# Patient Record
Sex: MALE | Race: WHITE
[De-identification: names, ages, dates, MRNs, and addresses within clinical notes are randomized per-mention and may not be internally consistent; named-entity substitution may affect disease eponyms.]

---

## 2018-07-27 ENCOUNTER — HOSPITAL ENCOUNTER (OUTPATIENT)
Dept: HOSPITAL 61 - ENDOS | Age: 68
Discharge: HOME | End: 2018-07-27
Attending: INTERNAL MEDICINE
Payer: MEDICARE

## 2018-07-27 DIAGNOSIS — Z12.11: Primary | ICD-10-CM

## 2018-07-27 DIAGNOSIS — K21.9: ICD-10-CM

## 2018-07-27 DIAGNOSIS — K64.0: ICD-10-CM

## 2018-07-27 DIAGNOSIS — Z98.890: ICD-10-CM

## 2018-07-27 DIAGNOSIS — Z80.0: ICD-10-CM

## 2018-07-27 DIAGNOSIS — J45.909: ICD-10-CM

## 2018-07-27 DIAGNOSIS — K57.30: ICD-10-CM

## 2018-07-27 DIAGNOSIS — M19.90: ICD-10-CM

## 2018-07-27 DIAGNOSIS — Z98.52: ICD-10-CM

## 2018-07-27 DIAGNOSIS — Z82.49: ICD-10-CM

## 2018-07-27 DIAGNOSIS — Z86.010: ICD-10-CM

## 2018-07-27 DIAGNOSIS — Z79.899: ICD-10-CM

## 2018-07-27 PROCEDURE — 45378 DIAGNOSTIC COLONOSCOPY: CPT

## 2018-07-27 RX ADMIN — SODIUM CHLORIDE, SODIUM LACTATE, POTASSIUM CHLORIDE, AND CALCIUM CHLORIDE 1 MLS/HR: .6; .31; .03; .02 INJECTION, SOLUTION INTRAVENOUS at 08:52

## 2021-08-17 ENCOUNTER — HOSPITAL ENCOUNTER (EMERGENCY)
Dept: HOSPITAL 63 - ER | Age: 71
Discharge: HOME | End: 2021-08-17
Payer: MEDICARE

## 2021-08-17 VITALS
SYSTOLIC BLOOD PRESSURE: 145 MMHG | SYSTOLIC BLOOD PRESSURE: 145 MMHG | DIASTOLIC BLOOD PRESSURE: 89 MMHG | DIASTOLIC BLOOD PRESSURE: 89 MMHG

## 2021-08-17 VITALS — HEIGHT: 72 IN | BODY MASS INDEX: 28.37 KG/M2 | WEIGHT: 209.44 LBS

## 2021-08-17 DIAGNOSIS — R33.8: Primary | ICD-10-CM

## 2021-08-17 DIAGNOSIS — J45.909: ICD-10-CM

## 2021-08-17 DIAGNOSIS — K21.9: ICD-10-CM

## 2021-08-17 DIAGNOSIS — R35.0: ICD-10-CM

## 2021-08-17 DIAGNOSIS — R31.9: ICD-10-CM

## 2021-08-17 LAB
APTT PPP: YELLOW S
BACTERIA #/AREA URNS HPF: (no result) /HPF
BILIRUB UR QL STRIP: (no result)
FIBRINOGEN PPP-MCNC: CLEAR MG/DL
GLUCOSE UR STRIP-MCNC: (no result) MG/DL
NITRITE UR QL STRIP: (no result)
RBC #/AREA URNS HPF: (no result) /HPF (ref 0–2)
SP GR UR STRIP: 1.01
SQUAMOUS #/AREA URNS LPF: (no result) /LPF
UROBILINOGEN UR-MCNC: 0.2 MG/DL
WBC #/AREA URNS HPF: (no result) /HPF (ref 0–4)

## 2021-08-17 PROCEDURE — 81001 URINALYSIS AUTO W/SCOPE: CPT

## 2021-08-17 PROCEDURE — 74176 CT ABD & PELVIS W/O CONTRAST: CPT

## 2021-08-17 PROCEDURE — 87086 URINE CULTURE/COLONY COUNT: CPT

## 2021-08-17 PROCEDURE — 99285 EMERGENCY DEPT VISIT HI MDM: CPT

## 2021-08-17 PROCEDURE — 51702 INSERT TEMP BLADDER CATH: CPT

## 2021-08-17 PROCEDURE — 99284 EMERGENCY DEPT VISIT MOD MDM: CPT

## 2021-08-18 ENCOUNTER — HOSPITAL ENCOUNTER (OUTPATIENT)
Dept: HOSPITAL 63 - NM | Age: 71
End: 2021-08-18
Payer: MEDICARE

## 2021-08-18 DIAGNOSIS — R06.00: Primary | ICD-10-CM

## 2021-08-18 PROCEDURE — 93017 CV STRESS TEST TRACING ONLY: CPT

## 2021-08-18 PROCEDURE — 78452 HT MUSCLE IMAGE SPECT MULT: CPT

## 2021-08-18 PROCEDURE — A9500 TC99M SESTAMIBI: HCPCS

## 2021-08-18 NOTE — RAD
MR#: [id number]

Account#: [id number]

Accession#: [id number]

Date of Study: 08/18/2021

Ordering Physician: ZAK PEGUERO, 

Referring Physician: TATE ROUSSEAU Tech: RT CAITY Maldonado) (N)





--------------- APPROVED REPORT --------------





Test Type:          Pharmacological

Stress Nurse/Tech: RT Erica (JADA) (N)

Test Indications: dyspnea and fatigue

Cardiac History: AFib 1 year ago

Medications:     see ehr

Medical History: hypertension

Resting ECG:     sinus rhythm

Resting Heart Rate: 68 bpm

Resting Blood Pressure: 137/77mmHg

Pretest Chest Pain: None



Nurse/Tech Notes

Consent: The procedure was explained to the patient in lay terms. Informed consent was witnessed. Ricky
eout was entered into [facility] [employer]. History and Stress Test performed by RT CAITY Maldonado) (N)



Pharm. Details

Pharmacologic stress testing was performed using 0.4mg per 5ml of regadenoson given intravenously ove
r 7-10 seconds.



POST EXERCISE

Reason for Termination: Infusion complete

Max HR: 158 bpm

Max Blood Pressure: 141/69mmHg



INTERPRETATION

Stress EKG Conclusion: The resting EKG shows a sinus rhythm with nonspecific ST-T wave changes.

The stress EKG shows no significant changes from baseline.

No EKG evidence of stress-induced ischemia.



Imaging Protocol

IMAGE PROTOCOL: Rest Tc-99m/stress Tc-99m 1 day



Rest:            Stress:         Viability:   

Radiopharm.Tc99m LhejctsgkNb04s Sestamibi

Ddiy90tAc            31mCi            

Duration    15min.           10min.           

Img Date  08/18/2021 08/18/2021      

Inj-Img Xquu41acd.           60min.           



Rest Admin Site:IV - Right AntecubitalAdministrator: RT Erica (JADA)(N)

Stress Admin Site: IV - Right AntecubitalAdministrator: RT CAITY Maldonado)(N)



STRESS DATA

End Diast. Vol.122.0mlAv. Heart Rate83.0bpm

End Syst. Vol.28.0mlCO Index BSA0.0L/min

Myocardial Avmx178.0gEject. Xcsrnfxc14.0%



Stress Rates

Pk. Fill Rate3.72EDV/secLVtime Pk. Fill 205.42msec

Pk. Empty Rate4.77ESV/secLVtime Pk. Irsts928.95msec

1/3 Pk. Fill1.43EDV/sec



Stress Scores

Regional WT0.00Summed WT0.00

Regional WM0.00Summed WM1.00



LV Perfusion

The stress scans show no significant defects.

The rest scans show no significant defects.

Nuclear imaging shows no reversible ischemia or infarct.



Wall Motion

Left ventricular systolic function is normal with no regional wall motion abnormalities and an ejecti
on fraction of greater than 70%.



LV Perf. Quant

17 Seg. SSS2.00

17 Seg. SRS3.00

17 Seg. SDS0.00

Stress Defect Extent (% LAD)0.00Rest Defect Extent (% LAD)0.00Rev. Defect Extent (% LAD)0.00

Stress Defect Extent (% LCX) 18.80Rest Defect Extent (% LCX)26.30Rev. Defect Extent (% LCX)6.30

Stress Defect Extent (% RCA)0.00Rest Defect Extent (% RCA)8.90Rev. Defect Extent (% RCA)0.00

Stress Defect Extent (% DUC)3.30Rest Defect Extent (% DUC)8.00Rev. Defect Extent (% DUC)1.10



Conclusion

1. No EKG evidence of stress-induced ischemia.

2. Nuclear imaging shows no reversible ischemia or infarct.

3. Left ventricular systolic function is normal with an ejection fraction of greater than 70%.

4. Low risk Lexiscan nuclear stress test.



Signed by : Corey Cortez MD

Electronically Approved : 08/18/2021 13:17:48

## 2021-08-20 ENCOUNTER — HOSPITAL ENCOUNTER (EMERGENCY)
Dept: HOSPITAL 63 - ER | Age: 71
Discharge: HOME | End: 2021-08-20
Payer: MEDICARE

## 2021-08-20 VITALS — DIASTOLIC BLOOD PRESSURE: 87 MMHG | SYSTOLIC BLOOD PRESSURE: 150 MMHG

## 2021-08-20 VITALS — HEIGHT: 72 IN | BODY MASS INDEX: 28.37 KG/M2 | WEIGHT: 209.44 LBS

## 2021-08-20 DIAGNOSIS — T83.511A: Primary | ICD-10-CM

## 2021-08-20 DIAGNOSIS — K21.9: ICD-10-CM

## 2021-08-20 DIAGNOSIS — N39.0: ICD-10-CM

## 2021-08-20 DIAGNOSIS — J45.909: ICD-10-CM

## 2021-08-20 LAB
APTT PPP: YELLOW S
BACTERIA #/AREA URNS HPF: (no result) /HPF
BILIRUB UR QL STRIP: (no result)
FIBRINOGEN PPP-MCNC: (no result) MG/DL
GLUCOSE UR STRIP-MCNC: (no result) MG/DL
NITRITE UR QL STRIP: (no result)
RBC #/AREA URNS HPF: (no result) /HPF (ref 0–2)
SP GR UR STRIP: 1.02
SQUAMOUS #/AREA URNS LPF: (no result) /LPF
UROBILINOGEN UR-MCNC: 0.2 MG/DL
WBC #/AREA URNS HPF: (no result) /HPF (ref 0–4)

## 2021-08-20 PROCEDURE — 99283 EMERGENCY DEPT VISIT LOW MDM: CPT

## 2021-08-20 PROCEDURE — 87086 URINE CULTURE/COLONY COUNT: CPT

## 2021-08-20 PROCEDURE — 51702 INSERT TEMP BLADDER CATH: CPT

## 2021-08-20 PROCEDURE — 81001 URINALYSIS AUTO W/SCOPE: CPT

## 2021-08-20 NOTE — PHYS DOC
Past History


Past Medical History:  Asthma, GERD, Other


Additional Past Medical Histor:  NECK


Past Surgical History:  TURP, Other


Additional Past Surgical Histo:  WRIST


Alcohol Use:  None





General Adult


EDM:


Chief Complaint:  URINE CATHETER PROBLEM





HPI:


HPI:


71-year-old male presents with concern about his Mandujano catheter.  The patient 

has noticed some sediment in the urine.  He is also had some spasms of the 

bladder and wants to make sure things are working properly.  He thought he had a

fever at home.  No fever on arrival.  He has no other concerns at this time.





Review of Systems:


Review of Systems:


Constitutional:  Denies fever or chills 


Eyes:  Denies change in visual acuity 


HENT:  Denies nasal congestion or sore throat 


Respiratory:  Denies cough or shortness of breath 


Cardiovascular:  Denies chest pain or edema 


GI:  Denies abdominal pain, nausea, vomiting, bloody stools or diarrhea 


: Urinary issues


Musculoskeletal:  Denies back pain or joint pain 


Integument:  Denies rash 


Neurologic:  Denies headache, focal weakness or sensory changes 


Endocrine:  Denies polyuria or polydipsia 


Lymphatic:  Denies swollen glands 


Psychiatric:  Denies depression or anxiety





Allergies:


Allergies:





Allergies








Coded Allergies Type Severity Reaction Last Updated Verified


 


  No Known Drug Allergies    8/17/21 No











Physical Exam:


PE:





Constitutional: Well developed, well nourished, no acute distress, non-toxic ap

pearance. []


HENT: Normocephalic, atraumatic, bilateral external ears normal, oropharynx 

moist, no oral exudates, nose normal. []


Eyes: PERRLA, EOMI, conjunctiva normal, no discharge. [] 


Neck: Normal range of motion, no tenderness, supple, no stridor. [] 


Cardiovascular:Heart rate regular rhythm, no murmur []


Lungs & Thorax:  Bilateral breath sounds clear to auscultation []


Abdomen: Bowel sounds normal, soft, no tenderness, no masses, no pulsatile 

masses. [] 


Skin: Warm, dry, no erythema, no rash. [] 


Back: No tenderness, no CVA tenderness. [] 


Extremities: No tenderness, no cyanosis, no clubbing, ROM intact, no edema. [] 


Neurologic: Alert and oriented X 3, normal motor function, normal sensory 

function, no focal deficits noted. []


Psychologic: Affect normal, judgement normal, mood normal.


: Mandujano catheter in place.  []





Current Patient Data:


Vital Signs:





                                   Vital Signs








  Date Time  Temp Pulse Resp B/P (MAP) Pulse Ox O2 Delivery O2 Flow Rate FiO2


 


8/20/21 15:58 98.9 87 16 151/91 95 Room Air  











EKG:


EKG:


[]





Radiology/Procedures:


Radiology/Procedures:


[]





Heart Score:


C/O Chest Pain:  N/A


Risk Factors:


Risk Factors:  DM, Current or recent (<one month) smoker, HTN, HLP, family 

history of CAD, obesity.


Risk Scores:


Score 0 - 3:  2.5% MACE over next 6 weeks - Discharge Home


Score 4 - 6:  20.3% MACE over next 6 weeks - Admit for Clinical Observation


Score 7 - 10:  72.7% MACE over next 6 weeks - Early Invasive Strategies





Course & Med Decision Making:


Course & Med Decision Making


Pertinent Labs and Imaging studies reviewed. (See chart for details)


The patient's urinalysis is positive for nitrate and leukocyte esterase.  He has

 a lot of bacteria and white cells.  Given the Mandujano catheter, I will treat him 

with levofloxacin 500.  We will give the first dose in the ED.  He is stable for

 discharge at this time.


[]





Dragon Disclaimer:


Dragon Disclaimer:


This electronic medical record was generated, in whole or in part, using a voice

 recognition dictation system.





Departure


Departure:


Impression:  


   Primary Impression:  


   UTI (urinary tract infection)


   Qualified Codes:  T83.511A - Infection and inflammatory reaction due to 

   indwelling urethral catheter, initial encounter; N39.0 - Urinary tract 

   infection, site not specified


Disposition:  01 HOME / SELF CARE / HOMELESS


Condition:  STABLE


Referrals:  


LEESA MARTINES MD (PCP)


Patient Instructions:  Catheter-Associated Urinary Tract Infection FAQs - SHEA, 

Urinary Tract Infection


Scripts


Levofloxacin (LEVOFLOXACIN) 500 Mg Tablet


1 TAB PO DAILY for UTI, #7 TAB


   Prov: REAGAN MORRISSEY DO         8/20/21











REAGAN MORRISSEY DO                 Aug 20, 2021 16:37

## 2021-08-26 ENCOUNTER — HOSPITAL ENCOUNTER (EMERGENCY)
Dept: HOSPITAL 63 - ER | Age: 71
Discharge: HOME | End: 2021-08-26
Payer: MEDICARE

## 2021-08-26 VITALS — SYSTOLIC BLOOD PRESSURE: 126 MMHG | DIASTOLIC BLOOD PRESSURE: 64 MMHG

## 2021-08-26 VITALS — HEIGHT: 72 IN | BODY MASS INDEX: 28.37 KG/M2 | WEIGHT: 209.44 LBS

## 2021-08-26 VITALS
SYSTOLIC BLOOD PRESSURE: 139 MMHG | SYSTOLIC BLOOD PRESSURE: 139 MMHG | DIASTOLIC BLOOD PRESSURE: 69 MMHG | DIASTOLIC BLOOD PRESSURE: 69 MMHG

## 2021-08-26 VITALS — SYSTOLIC BLOOD PRESSURE: 162 MMHG | DIASTOLIC BLOOD PRESSURE: 45 MMHG

## 2021-08-26 VITALS — SYSTOLIC BLOOD PRESSURE: 136 MMHG | DIASTOLIC BLOOD PRESSURE: 76 MMHG

## 2021-08-26 VITALS — SYSTOLIC BLOOD PRESSURE: 127 MMHG | DIASTOLIC BLOOD PRESSURE: 64 MMHG

## 2021-08-26 VITALS — DIASTOLIC BLOOD PRESSURE: 69 MMHG | SYSTOLIC BLOOD PRESSURE: 139 MMHG

## 2021-08-26 VITALS — SYSTOLIC BLOOD PRESSURE: 139 MMHG | DIASTOLIC BLOOD PRESSURE: 83 MMHG

## 2021-08-26 DIAGNOSIS — R42: ICD-10-CM

## 2021-08-26 DIAGNOSIS — K21.9: ICD-10-CM

## 2021-08-26 DIAGNOSIS — J45.909: ICD-10-CM

## 2021-08-26 DIAGNOSIS — D64.9: Primary | ICD-10-CM

## 2021-08-26 LAB
ALBUMIN SERPL-MCNC: 2.8 G/DL (ref 3.4–5)
ALBUMIN/GLOB SERPL: 1 {RATIO} (ref 1–1.7)
ALP SERPL-CCNC: 51 U/L (ref 46–116)
ALT SERPL-CCNC: 29 U/L (ref 16–63)
ANION GAP SERPL CALC-SCNC: 4 MMOL/L (ref 6–14)
APTT PPP: YELLOW S
AST SERPL-CCNC: 24 U/L (ref 15–37)
BACTERIA #/AREA URNS HPF: 0 /HPF
BASOPHILS # BLD AUTO: 0 X10^3/UL (ref 0–0.2)
BASOPHILS NFR BLD: 1 % (ref 0–3)
BILIRUB SERPL-MCNC: 0.1 MG/DL (ref 0.2–1)
BILIRUB UR QL STRIP: (no result)
BUN/CREAT SERPL: 15 (ref 6–20)
CA-I SERPL ISE-MCNC: 20 MG/DL (ref 8–26)
CALCIUM SERPL-MCNC: 8.5 MG/DL (ref 8.5–10.1)
CHLORIDE SERPL-SCNC: 106 MMOL/L (ref 98–107)
CO2 SERPL-SCNC: 29 MMOL/L (ref 21–32)
CREAT SERPL-MCNC: 1.3 MG/DL (ref 0.7–1.3)
EOSINOPHIL NFR BLD: 0.2 X10^3/UL (ref 0–0.7)
EOSINOPHIL NFR BLD: 3 % (ref 0–3)
ERYTHROCYTE [DISTWIDTH] IN BLOOD BY AUTOMATED COUNT: 15.1 % (ref 11.5–14.5)
FIBRINOGEN PPP-MCNC: CLEAR MG/DL
GFR SERPLBLD BASED ON 1.73 SQ M-ARVRAT: 54.1 ML/MIN
GLOBULIN SER-MCNC: 2.7 G/DL (ref 2.2–3.8)
GLUCOSE SERPL-MCNC: 135 MG/DL (ref 70–99)
GLUCOSE UR STRIP-MCNC: (no result) MG/DL
HCT VFR BLD CALC: 22.8 % (ref 39–53)
HGB BLD-MCNC: 7.4 G/DL (ref 13–17.5)
LYMPHOCYTES # BLD: 0.8 X10^3/UL (ref 1–4.8)
LYMPHOCYTES NFR BLD AUTO: 16 % (ref 24–48)
MAGNESIUM SERPL-MCNC: 2.1 MG/DL (ref 1.8–2.4)
MCH RBC QN AUTO: 29 PG (ref 25–35)
MCHC RBC AUTO-ENTMCNC: 33 G/DL (ref 31–37)
MCV RBC AUTO: 88 FL (ref 79–100)
MONO #: 0.4 X10^3/UL (ref 0–1.1)
MONOCYTES NFR BLD: 8 % (ref 0–9)
NEUT #: 3.7 X10^3UL (ref 1.8–7.7)
NEUTROPHILS NFR BLD AUTO: 73 % (ref 31–73)
NITRITE UR QL STRIP: (no result)
PLATELET # BLD AUTO: 230 X10^3/UL (ref 140–400)
POTASSIUM SERPL-SCNC: 4.3 MMOL/L (ref 3.5–5.1)
PROT SERPL-MCNC: 5.5 G/DL (ref 6.4–8.2)
RBC # BLD AUTO: 2.6 X10^6/UL (ref 4.3–5.7)
RBC #/AREA URNS HPF: 0 /HPF (ref 0–2)
SODIUM SERPL-SCNC: 139 MMOL/L (ref 136–145)
SP GR UR STRIP: 1.01
SQUAMOUS #/AREA URNS LPF: (no result) /LPF
UROBILINOGEN UR-MCNC: 0.2 MG/DL
WBC # BLD AUTO: 5.1 X10^3/UL (ref 4–11)
WBC #/AREA URNS HPF: (no result) /HPF (ref 0–4)

## 2021-08-26 PROCEDURE — 83605 ASSAY OF LACTIC ACID: CPT

## 2021-08-26 PROCEDURE — 86920 COMPATIBILITY TEST SPIN: CPT

## 2021-08-26 PROCEDURE — 84484 ASSAY OF TROPONIN QUANT: CPT

## 2021-08-26 PROCEDURE — 36415 COLL VENOUS BLD VENIPUNCTURE: CPT

## 2021-08-26 PROCEDURE — 81001 URINALYSIS AUTO W/SCOPE: CPT

## 2021-08-26 PROCEDURE — 85025 COMPLETE CBC W/AUTO DIFF WBC: CPT

## 2021-08-26 PROCEDURE — 80053 COMPREHEN METABOLIC PANEL: CPT

## 2021-08-26 PROCEDURE — 93005 ELECTROCARDIOGRAM TRACING: CPT

## 2021-08-26 PROCEDURE — 36430 TRANSFUSION BLD/BLD COMPNT: CPT

## 2021-08-26 PROCEDURE — 99291 CRITICAL CARE FIRST HOUR: CPT

## 2021-08-26 PROCEDURE — 83880 ASSAY OF NATRIURETIC PEPTIDE: CPT

## 2021-08-26 PROCEDURE — 86850 RBC ANTIBODY SCREEN: CPT

## 2021-08-26 PROCEDURE — 86901 BLOOD TYPING SEROLOGIC RH(D): CPT

## 2021-08-26 PROCEDURE — 82947 ASSAY GLUCOSE BLOOD QUANT: CPT

## 2021-08-26 PROCEDURE — 82550 ASSAY OF CK (CPK): CPT

## 2021-08-26 PROCEDURE — 71045 X-RAY EXAM CHEST 1 VIEW: CPT

## 2021-08-26 PROCEDURE — P9016 RBC LEUKOCYTES REDUCED: HCPCS

## 2021-08-26 PROCEDURE — 83735 ASSAY OF MAGNESIUM: CPT

## 2021-08-26 PROCEDURE — 86900 BLOOD TYPING SEROLOGIC ABO: CPT

## 2021-08-26 PROCEDURE — 70450 CT HEAD/BRAIN W/O DYE: CPT

## 2021-08-26 NOTE — EKG
[facility] [address], [place] [address]

Test Date:    2021               Test Time:    11:19:01

Pat Name:     GURU PUTNAM              Department:   

Patient ID:   [id number]           Room:          

Gender:       M                        Technician:   1

:          1950               Requested By: MARCELA BURDICK

Order Number: [id number]            Reading MD:     

                                 Measurements

Intervals                              Axis          

Rate:         76                       P:            0

IN:           218                      QRS:          -12

QRSD:         88                       T:            14

QT:           382                                    

QTc:          434                                    

                           Interpretive Statements

SINUS RHYTHM

PROLONGED IN INTERVAL

LEFTWARD AXIS

ABNORMAL ECG

RI6.02

No previous ECG available for comparison

## 2021-08-26 NOTE — RAD
EXAM: Chest, single view.



HISTORY: Shortness of air.



COMPARISON: None.



FINDINGS: A frontal view of the chest is obtained. There is no infiltrate, pleural effusion or pneumo
thorax. The heart is normal in size. There is a large hiatal hernia.



IMPRESSION: No acute pulmonary finding.



Electronically signed by: Shayna Wakefield MD (8/26/2021 11:43 AM) [id number]

## 2021-08-26 NOTE — PHYS DOC
Past History


Past Medical History:  Asthma, GERD, Other


Additional Past Medical Histor:  NECK


 (CRISTIANA CARTWRIGHT)


Past Surgical History:  TURP, Other


Additional Past Surgical Histo:  WRIST


 (CRISTIANA CARTWRIGHT)


Alcohol Use:  None


 (CRISTIANA CARTWRIGHT)





Adult General


Chief Complaint


Chief Complaint:  DIZZY/LIGHT HEADED





HPI


HPI





Patient is a [71-year-old male patient presents feeling lightheaded.  States he 

has felt like this for approximately last week.  States he has seen his primary 

care provider yesterday for removal of his Mandujano catheter, he had a Mandujano 

catheter placed for urinary retention recently.  Reports he was diagnosed with 

urinary tract infection and started on Levaquin 1 week ago as well.  States he 

has had no fevers since the first day when he was diagnosed with a UTI.  States 

no additional changes to his medicines, he does report he has a history of prior

 A. fib episode approximate 1 year ago when he was started started on diltiazem.

  States no nausea, no vomiting, states some constipation related to his 

Levaquin use, he has been taking stool softener.  Denies change in urination, 

states he has been urinating normally, to 3 times at night as he has been.  

Denies any feeling of the room spinning states however when he is moving around 

he just feels his head getting wider.  States he has also had some exertional 

dyspnea and exertional fatigue, where he used to build to mow the lawn without 

problems and now he has had to stop and rest after trying to move or walk a 

little bit.  Reports while he was standing in the kitchen today, he had to sit 

down because he was feeling fatigued.  Denies any chest pain, however he does 

report for the last couple weeks he has had some intermittent chest heaviness.  

He has an echocardiogram scheduled for next week with his cardiologist.


 (CRISTIANA CARTWRIGHT)





Review of Systems


Review of Systems





Constitutional: Denies fever or chills []


Eyes: Denies change in visual acuity, redness, or eye pain []


HENT: Denies nasal congestion or sore throat []


Respiratory: Denies cough.  Does state some exertional shortness of breath 

intermittently, denies at this current moment.


Cardiovascular: No additional information not addressed in HPI []


GI: Denies abdominal pain, nausea, vomiting, bloody stools or diarrhea [] does 

states intermittent constipation since he is been on Levaquin


: Denies dysuria or hematuria [] reports recent UTI, he is on Levaquin 

currently, states no current symptoms


Musculoskeletal: Denies back pain or joint pain []


Integument: Denies rash or skin lesions []


Neurologic: Denies headache, focal weakness or sensory changes [] does report 

lightheadedness, states the lightheadedness occurs when he moves around or 

stands.


Endocrine: Denies polyuria or polydipsia []





All other systems were reviewed and found to be within normal limits, except as 

documented in this note.


 (CRISTIANA CARTWRIGHT)





Allergies


Allergies





Allergies








Coded Allergies Type Severity Reaction Last Updated Verified


 


  No Known Drug Allergies    21 No








 (CRISTIANA CARTWRIGHT)





Physical Exam


Physical Exam





Constitutional: Well developed, well nourished, no acute distress, non-toxic 

appearance. []


HENT: Normocephalic, atraumatic, bilateral external ears normal, oropharynx 

moist, no oral exudates, nose normal. []


Eyes: PERRLA, EOMI, conjunctiva normal, no discharge. [] 


Neck: Normal range of motion, no tenderness, supple, no stridor. [] 


Cardiovascular:Heart rate regular rhythm, no murmur []


Lungs & Thorax:  Bilateral breath sounds clear to auscultation []


Abdomen: Bowel sounds normal, soft, no tenderness, no masses, no pulsatile 

masses. [] 


Skin: Warm, dry, no erythema, no rash. [] 


Back: No tenderness, no CVA tenderness. [] 


Extremities: No tenderness, no cyanosis, no clubbing, ROM intact, no edema. [] 


Neurologic: Alert and oriented X 3, normal motor function, normal sensory 

function, no focal deficits noted. []


Psychologic: Affect normal, judgement normal, mood normal. []


 (CRISTIANA CARTWRIGHT)





Current Patient Data


Vital Signs





                                   Vital Signs








  Date Time  Temp Pulse Resp B/P (MAP) Pulse Ox O2 Delivery O2 Flow Rate FiO2


 


21 10:57 98.5 78 16 119/64 98 Room Air  








 (CRISTIANA CARTWRIGHT)





EKG


EKG


[] Sinus rhythm without ST changes @ 76.  Left axis deviation.  Prolonged KS @ 

216 ms  QRS 58.  QTc 434.  No STEMI per Dr. Burdick


 (CRISTIANA CARTWRIGHT)





Radiology/Procedures


Radiology/Procedures


PATIENT: GURU PUTNAM GACCOUNT: [id number]#: [id number]


: 1950           LOCATION: ER              AGE: 71


SEX: M                    EXAM DT: 21         ACCESSION#: [id number]


STATUS: REG ER            ORD. PHYSICIAN: CRISTIANA CARTWRIGHT


REASON: dizziness


PROCEDURE: CT HEAD WO CONTRAST





EXAM: Head CT without contrast.





HISTORY: Dizziness.





TECHNIQUE: Computed tomographic images of the head were obtained without 

contrast.





*One or more of the following individualized dose reduction techniques were 

utilized for this examination:  


1. Automated exposure control.  


2. Adjustment of the mA and/or kV according to patient size.  


3. Use of iterative reconstruction technique.





COMPARISON: None.





FINDINGS: There is no acute or subacute extra-axial or intraparenchymal 

hemorrhage. There is no mass effect or midline shift. There is no hydrocephalus.

 





The gray-white matter differentiation pattern is intact. There is a right 

maxillary sinus mucous retention cyst. There is mild bilateral ethmoid sinus 

mucosal thickening. The orbits and mastoid air cells are unremarkable. There is 

no suspicious calvarial lesion.





IMPRESSION: 





No acute intracranial findings.





Electronically signed by: Shayna Gomes MD (2021 11:46 AM) [id number]














DICTATED AND SIGNED BY:     SHAYNA GOMES MD


DATE:     21 1145





CC: MARCELA BURDICK DO; CRISTIANA CARTWRIGHT; LEESA MARTINES MD ~MTH0 0








[]PATIENT: GURU PUTNAM GACCOUNT: [id number]#: [id number]


: 1950           LOCATION: ER              AGE: 71


SEX: M                    EXAM DT: 21         ACCESSION#: [id number]


STATUS: REG ER            ORD. PHYSICIAN: CRISTIANA CARTWRIGHT


REASON: SOA


PROCEDURE: CHEST AP ONLY





EXAM: Chest, single view.





HISTORY: Shortness of air.





COMPARISON: None.





FINDINGS: A frontal view of the chest is obtained. There is no infiltrate, 

pleural effusion or pneumothorax. The heart is normal in size. There is a large 

hiatal hernia.





IMPRESSION: No acute pulmonary finding.





Electronically signed by: Shayna Gomes MD (2021 11:43 AM) [id number]














DICTATED AND SIGNED BY:     SHAYNA GOMES MD


DATE:     21 1143





CC: MARCELA BURDICK DO; CRISTIANA CARTWRIGHT; LEESA MARTINES MD ~MTH0 0


 (CRISTIANA CARTWRIGHT)





Heart Score


C/O Chest Pain:  No


HEART Score for Chest Pain:  








HEART Score for Chest Pain Response (Comments) Value


 


History Moderately Suspicious 1


 


ECG Normal 0


 


Age > 65 2


 


Risk Factors                            1 or 2 Risk Factors 1


 


Troponin < Normal Limit 0


 


Total  4








Risk Factors:


Risk Factors:  DM, Current or recent (<one month) smoker, HTN, HLP, family 

history of CAD, obesity.


Risk Scores:


Risk Factors:  DM, Current or recent (<one month) smoker, HTN, HLP, family 

history of CAD, obesity.


 (CRISTIANA CARTWRIGHT)





Course & Med Decision Making


Course & Med Decision Making


Pertinent Labs and Imaging studies reviewed. (See chart for details)





[] @ 1200 Given hemoglobin 7.4 with dizziness, malaise, dyspnea, will transfuse 

unit of blood this time.  Patient reports he had quite a bit of bleeding 

following his bladder infection and urinary catheterization, with several large 

blood clots noted at the time.  States no further bleeding at this time however 

has felt some discomfort and malaise since that time.  Denies any blood in his 

stool.





@1715 Continue awaiting blood from blood bank.  Patient remains resting calmly 

in room at this time.





@1930 - Blood transfusion complete. patient reporting he feels better. Will plan

 to discharge at this time, with Rx for Iron tablets, and patient to follow up 

with PCP for re-evaluation in 1 week.  Urgent follow up or return to ER if any 

return of hematuria. 


 (CRISTIANA CARTWRIGHT)


Course & Med Decision Making


I was the Attending physician on the above date of service of this patient. This

 patient was evaluated, examined, treated, and dispositioned from the emergency 

department by the mid-level practitioner.  Patient had not received any blood at

 my time of shift and 6 PM 2021.  Signout was given to oncoming physician 

who will resume roles of attending physician after my shift.





Electronically signed, Marcela Burdick DO





 (CROW,MARCELA DO)


Dragon Disclaimer


Dragon Disclaimer


This electronic medical record was generated, in whole or in part, using a voice

 recognition dictation system.


 (CRISTIANA CARTWRIGHT)





Departure


Departure:


Impression:  


   Primary Impression:  


   Acute anemia


   Additional Impression:  


   Lightheaded


Disposition:   HOME / SELF CARE / HOMELESS


Condition:  STABLE


Referrals:  


LEESA MARTINES MD (PCP)


Patient Instructions:  Anemia, FAQs





Additional Instructions:  


As discussed, start taking the Iron pills daily to help improve your blood count

 and improve your anemia.  You may take them with your other vitamins in the 

morning


If you take it with Orange juice, your body can better utilize it


It is likely to make your stools more dark





Continue to take your antibiotic you had been taking, until it is completed. 


Follow up with your primary care provider in the next week to see if you are 

improving.


If you have additional bleeding in your urine or elsewhere, follow up with your 

primary care provider sooner, or return to the ER. 





Keep your follow up appointment with your cardiologist for your echocardiogram


Scripts


Ferrous Sulfate (MERCEDES-TIME) 325 Mg Tablet


325 MG PO DAILY for anemia for 30 Days, #30 TAB


   Prov: CRISTIANA CARTWRIGHT         21





Problem Qualifiers











CRISTIANA CARTWRIGHT              Aug 26, 2021 11:26


MARCELA BURDICK DO                 Aug 27, 2021 16:15

## 2021-08-26 NOTE — RAD
EXAM: Head CT without contrast.



HISTORY: Dizziness.



TECHNIQUE: Computed tomographic images of the head were obtained without contrast.



*One or more of the following individualized dose reduction techniques were utilized for this examina
tion:  

1. Automated exposure control.  

2. Adjustment of the mA and/or kV according to patient size.  

3. Use of iterative reconstruction technique.



COMPARISON: None.



FINDINGS: There is no acute or subacute extra-axial or intraparenchymal hemorrhage. There is no mass 
effect or midline shift. There is no hydrocephalus. 



The gray-white matter differentiation pattern is intact. There is a right maxillary sinus mucous rete
ntion cyst. There is mild bilateral ethmoid sinus mucosal thickening. The orbits and mastoid air cell
s are unremarkable. There is no suspicious calvarial lesion.



IMPRESSION: 



No acute intracranial findings.



Electronically signed by: Shayna Wakefield MD (8/26/2021 11:46 AM) [id number]

## 2021-08-30 ENCOUNTER — HOSPITAL ENCOUNTER (OUTPATIENT)
Dept: HOSPITAL 63 - ECHO | Age: 71
End: 2021-08-30
Payer: MEDICARE

## 2021-08-30 DIAGNOSIS — I51.7: ICD-10-CM

## 2021-08-30 DIAGNOSIS — I77.810: Primary | ICD-10-CM

## 2021-08-30 PROCEDURE — 93306 TTE W/DOPPLER COMPLETE: CPT

## 2021-08-30 NOTE — CARD
MR#: [id number]

Account#: [id number]

Accession#: [id number]

Date of Study: 08/30/2021

Ordering Physician: ZAK PEGUERO, 

Referring Physician: ZAK PEGUERO, 

Tech: Shilpa Sarmiento, [facility]





--------------- APPROVED REPORT --------------





EXAM: Two-dimensional and M-mode echocardiogram with Doppler and color Doppler.



Other Information 

Quality : AverageHR: 93bpm



INDICATION

Aortic Valve Disease

Aortic Dilatation



RISK FACTORS

Hypertension 

Asthma



2D DIMENSIONS 

RVDd3.6 (2.9-3.5cm)Left Atrium(2D)2.7 (1.6-4.0cm)

IVSd1.2 (0.7-1.1cm)Aortic Root(2D)3.7 (2.0-3.7cm)

LVDd4.7 (3.9-5.9cm)LVOT Diameter2.0 (1.8-2.4cm)

PWd1.0 (0.7-1.1cm)LVDs2.3 (2.5-4.0cm)

FS (%) 50.4 %SV82.4 ml

LVEF(%)81.7 (>50%)



Aortic Valve

AoV Peak Peter.209.3cm/sAoV VTI40.1cm

AO Peak GR.17.5mmHgLVOT Peak Peter.160.1cm/s

LVOT  VTI 33.27cmAO Mean GR.9mmHg

SILVER (VMAX)2.05fs9ZKT   (VTI)2.55cm2



Mitral Valve

MV E Tsnlgjfg10.9cm/sMV DECEL FXOX030zi

MV A Gdnkyjjm91.4cm/sE/A  Ratio1.0



Pulmonary Valve

PV Peak Bqtidhkn80.1cm/sPV Peak Grad.4mmHg



Tricuspid Valve

TR P. Jcuszsqx806cc/sTR Peak Gr.35mmHg



 LEFT VENTRICLE 

The left ventricle is normal size. There is borderline to mild concentric left ventricular hypertroph
y. The left ventricular systolic function is normal. The Ejection Fraction is 60-65%. There is normal
 LV segmental wall motion. Transmitral Doppler flow pattern is Grade I-abnormal relaxation pattern.



 RIGHT VENTRICLE 

The right ventricle is normal size. There is normal right ventricular wall thickness. The right ventr
icular systolic function is normal.



 ATRIA 

The left atrium size is normal. The right atrium size is normal. The interatrial septum is intact wit
h no evidence for an atrial septal defect or patent foramen ovale as noted on 2-D or Doppler imaging.




 AORTIC VALVE 

The aortic valve is normal in structure and function. Doppler and Color Flow revealed no significant 
aortic regurgitation. There is no significant aortic valvular stenosis. Calculated aortic valve area 
is 3.1 cm2 with maximum pressure gradient of 18 mmHg and mean pressure gradient of 9 mmHg. There is n
o aortic valvular vegetation.



 MITRAL VALVE 

The mitral valve is normal in structure and function. There is the appearance of mitral valve systoli
c anterior motion. There is no evidence of mitral valve prolapse. There is no mitral valve stenosis. 
Doppler and Color Flow revealed no mitral valve regurgitation noted.



 TRICUSPID VALVE 

The tricuspid valve is normal in structure and function. Doppler and Color Flow revealed trace tricus
pid regurgitation with an estimated PAP of 37 mmHg. There is no tricuspid valve stenosis.



 PULMONIC VALVE 

The pulmonic valve is not well visualized. Doppler and Color Flow revealed trace pulmonic valvular re
gurgitation.



 GREAT VESSELS 

The aortic root is normal in size. The ascending aorta is Mildly dilated measuring 4 cm. The IVC is n
ormal in size and collapses >50% with inspiration.



 PERICARDIAL EFFUSION 

There is no evidence of significant pericardial effusion.



Critical Notification

Critical Value: No



<Conclusion>

The left ventricular systolic function is normal.

The Ejection Fraction is 60-65%.

There is normal LV segmental wall motion.

Trace tricuspid regurgitation with an estimated PAP of 37 mmHg.

The ascending aorta is mildly dilated measuring 4 cm.

There is no evidence of significant pericardial effusion.



Signed by : Alex Bloom, 

Electronically Approved : 08/30/2021 14:59:41

## 2024-04-07 NOTE — RAD
CT abdomen and pelvis without contrast



PQRS statement: CT scans at this facility use dose reduction including either automated exposure cont
rol, iterative reconstructions, and /or weight based radiation dosing via mA and kV modification when
 appropriate to reduce radiation dose to as low as reasonably achievable.



HISTORY: Kidney stone. Right-sided pain.



COMPARISON: CT abdomen and pelvis July 24, 2021



Abdomen findings: Hiatal hernia of most of the stomach. Lung bases unremarkable. Mild changes of lowe
r thoracic and lumbar disc disease. Right hepatic lobe 6 cm cyst stable density of 12 units. Gallblad
james, pancreas, adrenals, spleen unremarkable. Tiny bilateral renal calculi measuring 1 to 3 mm in siz
e. There is decreased right renal hydronephrosis since the prior exam with mild persistent distention
 of the right renal pelvis and calyces and upper ureter remaining as well as mild persistent edema, t
he distal right ureteral 3 mm calculus on the prior exam has since passed and the larger right renal 
pelvis and lower pole calyx 1 cm density in the prior study has since resolved. Gallbladder, adrenals
, pancreas and spleen are unremarkable. No obstruction or inflammation GI tract. Appendix is negative
. No abdominal fluid.



Pelvis findings: Pelvic phleboliths. No distal ureteral calculi or bladder calculi. There may be prio
r TURP of the prostate. Rectum and bones are unremarkable. No pelvic fluid.



IMPRESSION:

1. The distal right ureteral 3 mm calculus on the prior exam has since passed and the hydronephrosis 
has decreased. See above.

2. Bilateral nephrolithiasis. Appendix is negative.

3. Other chronic findings are stable as described above.



Electronically signed by: Ivan Delatorre MD (8/17/2021 3:20 AM) [facility] Yes